# Patient Record
Sex: FEMALE | Race: WHITE | Employment: UNEMPLOYED | URBAN - METROPOLITAN AREA
[De-identification: names, ages, dates, MRNs, and addresses within clinical notes are randomized per-mention and may not be internally consistent; named-entity substitution may affect disease eponyms.]

---

## 2024-01-05 ENCOUNTER — HOSPITAL ENCOUNTER (OUTPATIENT)
Dept: MRI IMAGING | Age: 69
Discharge: HOME | End: 2024-01-05
Attending: STUDENT IN AN ORGANIZED HEALTH CARE EDUCATION/TRAINING PROGRAM
Payer: MEDICARE

## 2024-01-05 DIAGNOSIS — S22.000A COMPRESSION OF THORACIC VERTEBRA, CLOSED, INITIAL ENCOUNTER (HCC): ICD-10-CM

## 2024-01-05 PROCEDURE — 72146 MRI CHEST SPINE W/O DYE: CPT

## 2024-01-08 ENCOUNTER — TELEPHONE (OUTPATIENT)
Dept: INTERVENTIONAL RADIOLOGY/VASCULAR | Age: 69
End: 2024-01-08

## 2024-01-08 NOTE — TELEPHONE ENCOUNTER
Pre-op interview done.  Verified allergies, medications and past medical history.  Instructed on procedure, npo status, medications with restrictions and transportation.  Questions answered, verbalized understanding. Patient to hold eliquis starting 1/10.

## 2024-01-12 ENCOUNTER — HOSPITAL ENCOUNTER (OUTPATIENT)
Dept: INTERVENTIONAL RADIOLOGY/VASCULAR | Age: 69
Discharge: HOME | End: 2024-01-12
Payer: MEDICARE

## 2024-01-12 VITALS
DIASTOLIC BLOOD PRESSURE: 77 MMHG | HEIGHT: 63 IN | OXYGEN SATURATION: 100 % | WEIGHT: 244 LBS | TEMPERATURE: 97 F | SYSTOLIC BLOOD PRESSURE: 157 MMHG | BODY MASS INDEX: 43.23 KG/M2 | HEART RATE: 59 BPM | RESPIRATION RATE: 16 BRPM

## 2024-01-12 DIAGNOSIS — S22.000A COMPRESSION FRACTURE OF BODY OF THORACIC VERTEBRA (HCC): ICD-10-CM

## 2024-01-12 LAB
ANION GAP SERPL CALCULATED.3IONS-SCNC: 7 MMOL/L (ref 3–16)
BASOPHILS # BLD: 0.1 K/UL (ref 0–0.2)
BASOPHILS NFR BLD: 1.3 %
BUN SERPL-MCNC: 13 MG/DL (ref 7–20)
CALCIUM SERPL-MCNC: 8.6 MG/DL (ref 8.3–10.6)
CHLORIDE SERPL-SCNC: 104 MMOL/L (ref 99–110)
CO2 SERPL-SCNC: 27 MMOL/L (ref 21–32)
CREAT SERPL-MCNC: 0.9 MG/DL (ref 0.6–1.2)
DEPRECATED RDW RBC AUTO: 14.5 % (ref 12.4–15.4)
EOSINOPHIL # BLD: 0.3 K/UL (ref 0–0.6)
EOSINOPHIL NFR BLD: 4.3 %
GFR SERPLBLD CREATININE-BSD FMLA CKD-EPI: >60 ML/MIN/{1.73_M2}
GLUCOSE SERPL-MCNC: 115 MG/DL (ref 70–99)
HCT VFR BLD AUTO: 41.2 % (ref 36–48)
HGB BLD-MCNC: 13.7 G/DL (ref 12–16)
INR PPP: 0.99 (ref 0.84–1.16)
LYMPHOCYTES # BLD: 1.8 K/UL (ref 1–5.1)
LYMPHOCYTES NFR BLD: 26 %
MCH RBC QN AUTO: 29.1 PG (ref 26–34)
MCHC RBC AUTO-ENTMCNC: 33.3 G/DL (ref 31–36)
MCV RBC AUTO: 87.5 FL (ref 80–100)
MONOCYTES # BLD: 0.7 K/UL (ref 0–1.3)
MONOCYTES NFR BLD: 9.7 %
NEUTROPHILS # BLD: 4.1 K/UL (ref 1.7–7.7)
NEUTROPHILS NFR BLD: 58.7 %
PLATELET # BLD AUTO: 261 K/UL (ref 135–450)
PMV BLD AUTO: 8.7 FL (ref 5–10.5)
POTASSIUM SERPL-SCNC: 4.2 MMOL/L (ref 3.5–5.1)
PROTHROMBIN TIME: 13.1 SEC (ref 11.5–14.8)
RBC # BLD AUTO: 4.71 M/UL (ref 4–5.2)
SODIUM SERPL-SCNC: 138 MMOL/L (ref 136–145)
WBC # BLD AUTO: 7 K/UL (ref 4–11)

## 2024-01-12 PROCEDURE — 7100000011 HC PHASE II RECOVERY - ADDTL 15 MIN

## 2024-01-12 PROCEDURE — 80048 BASIC METABOLIC PNL TOTAL CA: CPT

## 2024-01-12 PROCEDURE — 85610 PROTHROMBIN TIME: CPT

## 2024-01-12 PROCEDURE — C1713 ANCHOR/SCREW BN/BN,TIS/BN: HCPCS

## 2024-01-12 PROCEDURE — 2580000003 HC RX 258: Performed by: RADIOLOGY

## 2024-01-12 PROCEDURE — 22513 PERQ VERTEBRAL AUGMENTATION: CPT

## 2024-01-12 PROCEDURE — 36415 COLL VENOUS BLD VENIPUNCTURE: CPT

## 2024-01-12 PROCEDURE — 6360000002 HC RX W HCPCS: Performed by: RADIOLOGY

## 2024-01-12 PROCEDURE — 7100000010 HC PHASE II RECOVERY - FIRST 15 MIN

## 2024-01-12 PROCEDURE — 99152 MOD SED SAME PHYS/QHP 5/>YRS: CPT

## 2024-01-12 PROCEDURE — 6360000004 HC RX CONTRAST MEDICATION: Performed by: RADIOLOGY

## 2024-01-12 PROCEDURE — 85025 COMPLETE CBC W/AUTO DIFF WBC: CPT

## 2024-01-12 PROCEDURE — 99153 MOD SED SAME PHYS/QHP EA: CPT

## 2024-01-12 PROCEDURE — 2500000003 HC RX 250 WO HCPCS: Performed by: RADIOLOGY

## 2024-01-12 RX ORDER — MIDAZOLAM HYDROCHLORIDE 5 MG/ML
INJECTION, SOLUTION INTRAMUSCULAR; INTRAVENOUS PRN
Status: COMPLETED | OUTPATIENT
Start: 2024-01-12 | End: 2024-01-12

## 2024-01-12 RX ORDER — FENTANYL CITRATE 50 UG/ML
INJECTION, SOLUTION INTRAMUSCULAR; INTRAVENOUS PRN
Status: COMPLETED | OUTPATIENT
Start: 2024-01-12 | End: 2024-01-12

## 2024-01-12 RX ORDER — LIDOCAINE HYDROCHLORIDE 10 MG/ML
10 INJECTION, SOLUTION EPIDURAL; INFILTRATION; INTRACAUDAL; PERINEURAL ONCE
Status: COMPLETED | OUTPATIENT
Start: 2024-01-12 | End: 2024-01-12

## 2024-01-12 RX ORDER — ONDANSETRON 2 MG/ML
4 INJECTION INTRAMUSCULAR; INTRAVENOUS EVERY 8 HOURS PRN
Status: DISCONTINUED | OUTPATIENT
Start: 2024-01-12 | End: 2024-01-13 | Stop reason: HOSPADM

## 2024-01-12 RX ORDER — IOPAMIDOL 408 MG/ML
30 INJECTION, SOLUTION INTRATHECAL
Status: COMPLETED | OUTPATIENT
Start: 2024-01-12 | End: 2024-01-12

## 2024-01-12 RX ORDER — KETOROLAC TROMETHAMINE 30 MG/ML
INJECTION, SOLUTION INTRAMUSCULAR; INTRAVENOUS PRN
Status: COMPLETED | OUTPATIENT
Start: 2024-01-12 | End: 2024-01-12

## 2024-01-12 RX ADMIN — FENTANYL CITRATE 50 MCG: 50 INJECTION, SOLUTION INTRAMUSCULAR; INTRAVENOUS at 08:58

## 2024-01-12 RX ADMIN — MIDAZOLAM HYDROCHLORIDE 1 MG: 5 INJECTION, SOLUTION INTRAMUSCULAR; INTRAVENOUS at 08:58

## 2024-01-12 RX ADMIN — IOPAMIDOL 30 ML: 408 INJECTION, SOLUTION INTRATHECAL at 09:49

## 2024-01-12 RX ADMIN — FENTANYL CITRATE 25 MCG: 50 INJECTION, SOLUTION INTRAMUSCULAR; INTRAVENOUS at 09:11

## 2024-01-12 RX ADMIN — MIDAZOLAM HYDROCHLORIDE 0.5 MG: 5 INJECTION, SOLUTION INTRAMUSCULAR; INTRAVENOUS at 09:29

## 2024-01-12 RX ADMIN — LIDOCAINE HYDROCHLORIDE 10 ML: 10 INJECTION, SOLUTION EPIDURAL; INFILTRATION; INTRACAUDAL; PERINEURAL at 09:49

## 2024-01-12 RX ADMIN — FENTANYL CITRATE 50 MCG: 50 INJECTION, SOLUTION INTRAMUSCULAR; INTRAVENOUS at 09:03

## 2024-01-12 RX ADMIN — MIDAZOLAM HYDROCHLORIDE 1 MG: 5 INJECTION, SOLUTION INTRAMUSCULAR; INTRAVENOUS at 09:03

## 2024-01-12 RX ADMIN — MIDAZOLAM HYDROCHLORIDE 1 MG: 5 INJECTION, SOLUTION INTRAMUSCULAR; INTRAVENOUS at 08:48

## 2024-01-12 RX ADMIN — KETOROLAC TROMETHAMINE 30 MG: 30 INJECTION, SOLUTION INTRAMUSCULAR; INTRAVENOUS at 09:22

## 2024-01-12 RX ADMIN — FENTANYL CITRATE 25 MCG: 50 INJECTION, SOLUTION INTRAMUSCULAR; INTRAVENOUS at 09:29

## 2024-01-12 RX ADMIN — CEFAZOLIN 2000 MG: 1 INJECTION, POWDER, FOR SOLUTION INTRAVENOUS at 08:48

## 2024-01-12 RX ADMIN — MIDAZOLAM HYDROCHLORIDE 0.5 MG: 5 INJECTION, SOLUTION INTRAMUSCULAR; INTRAVENOUS at 09:11

## 2024-01-12 ASSESSMENT — PAIN - FUNCTIONAL ASSESSMENT: PAIN_FUNCTIONAL_ASSESSMENT: NONE - DENIES PAIN

## 2024-01-12 NOTE — BRIEF OP NOTE
Brief Postoperative Note      Patient: Corina Krueger  YOB: 1955  MRN: [id number]    Date of Procedure: 1/12/2024    T6 Compression Fracture    Post-Op Diagnosis: Same    T6 Kyphoplasty      Anesthesia: Moderate Sedation    Estimated Blood Loss (mL): Minimal    Complications: None      Findings: Successful T6 kyphoplasty.       Electronically signed by Jamilah Miner MD on 1/12/2024 at 9:44 AM

## 2024-01-12 NOTE — PRE SEDATION
Sedation Pre-Procedure Note    Patient Name: Corina Krueger   YOB: 1955  Room/Bed: Room/bed info not found  Medical Record Number: [id number]  Date: 1/12/2024   Time: 9:43 AM       Indication:  T6 Compression Fracture    Consent: I have discussed with the patient and/or the patient representative the indication, alternatives, and the possible risks and/or complications of the planned procedure and the anesthesia methods. The patient and/or patient representative appear to understand and agree to proceed.    Vital Signs:   Vitals:    01/12/24 0935   BP: 130/79   Pulse: 55   Resp: 20   Temp:    SpO2: 98%       Past Medical History:   has a past medical history of CHF (congestive heart failure) ([facility]) and Hypertension.    Past Surgical History:   has no past surgical history on file.    Medications:   Scheduled Meds:   Continuous Infusions:   PRN Meds: ondansetron  Home Meds:   Prior to Admission medications    Not on File     Coumadin Use Last 7 Days:  no  Antiplatelet drug therapy use last 7 days: no  Other anticoagulant use last 7 days: no  Additional Medication Information:  NA      Pre-Sedation Documentation and Exam:   I have reviewed the patient's history and review of systems.    Mallampati Airway Assessment:  Mallampati Class II - (soft palate, fauces & uvula are visible)    Prior History of Anesthesia Complications:   none    ASA Classification:  Class 2 - A normal healthy patient with mild systemic disease    Sedation/ Anesthesia Plan:   intravenous sedation    Medications Planned:   midazolam (Versed) intravenously and fentanyl intravenously    Patient is an appropriate candidate for plan of sedation: yes    Electronically signed by Jamilah Miner MD on 1/12/2024 at 9:43 AM

## 2024-01-12 NOTE — PROGRESS NOTES
Pt on unit from IR report received at bedside. Pt denies any pain dressing to mid back CDI VSS pt resting call light in reach aware of bedrest order. Family at bedside.

## 2024-01-12 NOTE — DISCHARGE INSTRUCTIONS
Follow up with ordering physican with any questions, concerns, results, and an appointment after your procedure in the time period recommended.         Kyphoplasty Instructions       Fatigue is common and expected.  Let pain be your guide.  Walking or standing is encouraged every hour that you are awake; gradually increase walking to 1-2 miles per day.    It is OK to go up and down stairs and to walk outside.    Do NOT smoke.  Smoking delays healing and increases the risk of infection.    Apply ice intermittently to incision area for the first two days.  Then you may use heat instead if desired.      Incision Care  Do not submerge your incision under water.  No tub baths, hot tubs or swimming until after your doctor’s approval.  (Usually two weeks)    Incisions normally have a small amount of clear or pink drainage. If necessary, cover with a gauze and tape dressing.  If you have a temperature of 101 degrees (unrelieved by Tylenol), or a large amount of drainage or foul smelling drainage, call your doctor right away.    You may remove the bandaid and shower after 24 hours.  Pat incision dry after shower.       SEDATION DISCHARGE INSTRUCTIONS   1/12/2024    Wear your seatbelt home.  You are under the influence of sedative medications. Do not drink alcohol, drive, operate machinery, or make any important decisions or sign any legal documents for 24 hours.  A responsible adult needs to be with you for 24 hours.  You may experience lightheadedness, dizziness, nausea, heightened emotions and/or sleepiness following surgery.  Rest at home today- increase activity as tolerated.  Progress slowly to a regular diet and drink plenty of fluids unless your physician has instructed you otherwise.  If nausea becomes a problem, call your physician.  Coughing, sore throat, and muscle aches are other side effects of anesthesia and should improve with time.  Do not drive or operate machinery while taking narcotics.  ATTENTION FEMALE

## 2024-01-12 NOTE — PROGRESS NOTES
Discharge instructions reviewed with pt and family all questions answered. Pt remains free of pain VSS neuro checks WNL
